# Patient Record
Sex: FEMALE | Race: BLACK OR AFRICAN AMERICAN | Employment: OTHER | URBAN - METROPOLITAN AREA
[De-identification: names, ages, dates, MRNs, and addresses within clinical notes are randomized per-mention and may not be internally consistent; named-entity substitution may affect disease eponyms.]

---

## 2022-01-31 ENCOUNTER — HOSPITAL ENCOUNTER (EMERGENCY)
Age: 72
Discharge: HOME | End: 2022-02-01
Attending: EMERGENCY MEDICINE | Admitting: EMERGENCY MEDICINE
Payer: MEDICARE

## 2022-01-31 VITALS
HEART RATE: 74 BPM | TEMPERATURE: 97.3 F | OXYGEN SATURATION: 95 % | SYSTOLIC BLOOD PRESSURE: 152 MMHG | RESPIRATION RATE: 18 BRPM | DIASTOLIC BLOOD PRESSURE: 84 MMHG

## 2022-01-31 DIAGNOSIS — R46.89 VERBALLY ABUSIVE BEHAVIOR: Primary | ICD-10-CM

## 2022-01-31 LAB
APPEARANCE UR: ABNORMAL
BACTERIA URNS QL MICRO: NEGATIVE /HPF
BILIRUB UR QL: NEGATIVE
CAOX CRY URNS QL MICRO: ABNORMAL
COLOR UR: ABNORMAL
EPITH CASTS URNS QL MICRO: ABNORMAL /LPF
GLUCOSE UR STRIP.AUTO-MCNC: NEGATIVE MG/DL
HGB UR QL STRIP: NEGATIVE
KETONES UR QL STRIP.AUTO: ABNORMAL MG/DL
LEUKOCYTE ESTERASE UR QL STRIP.AUTO: NEGATIVE
NITRITE UR QL STRIP.AUTO: NEGATIVE
PH UR STRIP: 5.5 [PH] (ref 5–8)
PROT UR STRIP-MCNC: NEGATIVE MG/DL
RBC #/AREA URNS HPF: ABNORMAL /HPF (ref 0–5)
SP GR UR REFRACTOMETRY: 1.02 (ref 1–1.03)
UR CULT HOLD, URHOLD: NORMAL
UROBILINOGEN UR QL STRIP.AUTO: 1 EU/DL (ref 0.2–1)
WBC URNS QL MICRO: ABNORMAL /HPF (ref 0–4)

## 2022-01-31 PROCEDURE — 81001 URINALYSIS AUTO W/SCOPE: CPT

## 2022-01-31 PROCEDURE — 90791 PSYCH DIAGNOSTIC EVALUATION: CPT

## 2022-01-31 PROCEDURE — 99284 EMERGENCY DEPT VISIT MOD MDM: CPT

## 2022-01-31 RX ORDER — OMEPRAZOLE 40 MG/1
40 CAPSULE, DELAYED RELEASE ORAL DAILY
COMMUNITY

## 2022-01-31 RX ORDER — ADHESIVE BANDAGE
30 BANDAGE TOPICAL DAILY
COMMUNITY

## 2022-01-31 RX ORDER — POTASSIUM CHLORIDE 750 MG/1
10 CAPSULE, EXTENDED RELEASE ORAL DAILY
COMMUNITY

## 2022-01-31 RX ORDER — BUMETANIDE 1 MG/1
1 TABLET ORAL DAILY
COMMUNITY

## 2022-01-31 RX ORDER — FLUTICASONE PROPIONATE 50 MCG
2 SPRAY, SUSPENSION (ML) NASAL DAILY
COMMUNITY

## 2022-01-31 RX ORDER — OLANZAPINE 10 MG/1
10 TABLET ORAL
COMMUNITY

## 2022-01-31 RX ORDER — GUAIFENESIN 100 MG/5ML
81 LIQUID (ML) ORAL DAILY
COMMUNITY

## 2022-01-31 RX ORDER — AMOXICILLIN 250 MG
2 CAPSULE ORAL DAILY
COMMUNITY

## 2022-01-31 RX ORDER — BUSPIRONE HYDROCHLORIDE 15 MG/1
15 TABLET ORAL 3 TIMES DAILY
COMMUNITY

## 2022-01-31 RX ORDER — FLUTICASONE PROPIONATE AND SALMETEROL 500; 50 UG/1; UG/1
1 POWDER RESPIRATORY (INHALATION) EVERY 12 HOURS
COMMUNITY

## 2022-01-31 RX ORDER — TRAZODONE HYDROCHLORIDE 100 MG/1
100 TABLET ORAL
COMMUNITY

## 2022-01-31 RX ORDER — TEMAZEPAM 30 MG/1
30 CAPSULE ORAL
COMMUNITY

## 2022-01-31 RX ORDER — ACETAMINOPHEN 325 MG/1
650 TABLET ORAL
COMMUNITY

## 2022-01-31 RX ORDER — FLUOXETINE HYDROCHLORIDE 60 MG/1
60 TABLET, FILM COATED ORAL; ORAL DAILY
COMMUNITY

## 2022-01-31 RX ORDER — POLYETHYLENE GLYCOL 3350 17 G/17G
17 POWDER, FOR SOLUTION ORAL DAILY
COMMUNITY

## 2022-01-31 RX ORDER — ONDANSETRON 4 MG/1
4 TABLET, FILM COATED ORAL
COMMUNITY

## 2022-02-01 PROCEDURE — 74011250637 HC RX REV CODE- 250/637: Performed by: EMERGENCY MEDICINE

## 2022-02-01 RX ORDER — ALPRAZOLAM 0.5 MG/1
0.25 TABLET ORAL
Status: COMPLETED | OUTPATIENT
Start: 2022-02-01 | End: 2022-02-01

## 2022-02-01 RX ADMIN — ALPRAZOLAM 0.25 MG: 0.5 TABLET ORAL at 01:21
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
## 2022-02-01 NOTE — DISCHARGE INSTRUCTIONS
You were evaluated by our mental health team. They do not feel you need to be admitted to our behavioral health unit tonight. Follow up with your psychiatrist and primary care doctor. If you feel you are in danger of harming  yourself or others, return here.
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
## 2022-02-01 NOTE — BSMART NOTE
This writer contacted [address] [place] [address] and spoke with attending nurse Monet Hernandez). She confirmed that patient will be able to discharge back to the nursing home. She also confirmed that patient has outpatient psychiatrist to follow up with and in-house  she meets with. Patient will be provided with 1:1 sitter until patient moves in with new roommate. No other concerns were voiced at this time.

## 2023-02-05 ENCOUNTER — APPOINTMENT (OUTPATIENT)
Dept: CT IMAGING | Age: 73
End: 2023-02-05
Attending: STUDENT IN AN ORGANIZED HEALTH CARE EDUCATION/TRAINING PROGRAM
Payer: MEDICARE

## 2023-02-05 ENCOUNTER — HOSPITAL ENCOUNTER (EMERGENCY)
Age: 73
Discharge: NURSING, REHAB OR LONG-TERM CARE | End: 2023-02-05
Attending: STUDENT IN AN ORGANIZED HEALTH CARE EDUCATION/TRAINING PROGRAM
Payer: MEDICARE

## 2023-02-05 ENCOUNTER — APPOINTMENT (OUTPATIENT)
Dept: GENERAL RADIOLOGY | Age: 73
End: 2023-02-05
Attending: STUDENT IN AN ORGANIZED HEALTH CARE EDUCATION/TRAINING PROGRAM
Payer: MEDICARE

## 2023-02-05 VITALS
HEART RATE: 71 BPM | DIASTOLIC BLOOD PRESSURE: 72 MMHG | RESPIRATION RATE: 17 BRPM | TEMPERATURE: 97.6 F | WEIGHT: 258.16 LBS | OXYGEN SATURATION: 95 % | SYSTOLIC BLOOD PRESSURE: 112 MMHG

## 2023-02-05 DIAGNOSIS — S49.92XA INJURY OF LEFT SHOULDER, INITIAL ENCOUNTER: ICD-10-CM
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
## 2023-02-05 DIAGNOSIS — S09.90XA INJURY OF HEAD, INITIAL ENCOUNTER: ICD-10-CM

## 2023-02-05 DIAGNOSIS — S16.1XXA STRAIN OF NECK MUSCLE, INITIAL ENCOUNTER: ICD-10-CM

## 2023-02-05 PROCEDURE — 73030 X-RAY EXAM OF SHOULDER: CPT

## 2023-02-05 PROCEDURE — 72125 CT NECK SPINE W/O DYE: CPT

## 2023-02-05 PROCEDURE — 70450 CT HEAD/BRAIN W/O DYE: CPT

## 2023-02-05 PROCEDURE — 99284 EMERGENCY DEPT VISIT MOD MDM: CPT
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
## 2023-02-05 NOTE — ED NOTES
Verbal shift change report given to Adair Poe RN  (oncoming nurse) by Ruben Coker (offgoing nurse). Report included the following information SBAR, ED Summary, and Recent Results - Awaiting CT results. Eva King

## 2023-02-05 NOTE — ROUTINE PROCESS
I have reviewed discharge instructions with the patient. The patient verbalized understanding. Attempted to call report to IVETTE DORAN JR. [facility] supervisor; no response.

## 2023-02-05 NOTE — DISCHARGE INSTRUCTIONS
Seek immediate care for increased sleepiness, irritability, focal deficits (not moving an arm or leg, face looks different, numbness) and vomiting more than once.      RETURN IF WORSENING PAIN, CHANGE IN COLOR, CHANGE IN TEMPERATURE, OR LOSS OF SENSATION IN THE AFFECTED EXTREMITY      RETURN IF WORSENING PAIN, TROUBLE HOLDING STOOL/URINE, RADIATION OF PAIN, OR WEAKNESS/NUMBNESS

## 2023-02-05 NOTE — ED PROVIDER NOTES
The patient is 62-year-old female history of paranoid schizophrenia, obesity, hypertension, hyperlipidemia, GERD, diabetes, COPD, CHF presenting today secondary to abdominal fall. Patient is unsteady at baseline on her feet. She states that she was trying to get to the bathroom overnight this evening and was going to fast, lost her balance and fell. She struck the back of her head on the metal bed frame. No loss of consciousness. She was able to get up afterwards and go to the bathroom. She reports that she does have some neck pain as well as some pain/numbness to the posterior aspect of her left shoulder but does not radiate down the arm. No chest or abdominal injury. No lower extremity injury. She is not on blood thinners. Vital signs stable per EMS while here. No medications taken prior to arrival.  Patient reports pain is mild at this time.        Past Medical History:   Diagnosis Date    Anemia     Anxiety     CAD (coronary artery disease)     CHF (congestive heart failure) (HCC)     Chronic obstructive pulmonary disease (HCC)     COVID-19     Diabetes (HCC)     Difficulty walking     GERD (gastroesophageal reflux disease)     HTN (hypertension)     MDD (major depressive disorder)     Mixed hyperlipidemia     Morbid obesity (HCC)     Pain     Paranoid schizophrenia ([facility]     Polyosteoarthritis          Social History     Socioeconomic History    Marital status:      Spouse name: Not on file    Number of children: Not on file    Years of education: Not on file    Highest education level: Not on file   Occupational History    Not on file   Tobacco Use    Smoking status: Unknown    Smokeless tobacco: Not on file   Substance and Sexual Activity    Alcohol use: Not Currently    Drug use: Never    Sexual activity: Not on file   Other Topics Concern    Not on file   Social History Narrative    Not on file     Social Determinants of Health     Financial Resource Strain: Not on file   Food Insecurity: Not on file   Transportation Needs: Not on file   Physical Activity: Not on file   Stress: Not on file   Social Connections: Not on file   Intimate Partner Violence: Not on file   Housing Stability: Not on file         ALLERGIES: Ace inhibitors, Lithium carbonate, Seroquel [quetiapine], and Shrimp    Review of Systems   Constitutional:  Negative for chills and fever. HENT:  Negative for congestion and rhinorrhea. Eyes:  Negative for redness and visual disturbance. Respiratory:  Negative for cough and shortness of breath. Cardiovascular:  Negative for chest pain and leg swelling. Gastrointestinal:  Negative for abdominal pain, diarrhea, nausea and vomiting. Genitourinary:  Negative for dysuria, flank pain, frequency, hematuria and urgency. Musculoskeletal:  Positive for arthralgias and neck pain. Negative for back pain and myalgias. Skin:  Negative for rash and wound. Allergic/Immunologic: Negative for immunocompromised state. Neurological:  Positive for numbness and headaches. Negative for dizziness. Vitals:    02/05/23 0220 02/05/23 0230 02/05/23 0300 02/05/23 0315   BP: (!) 153/69 136/68  (!) 141/68   Pulse: 71      Resp: 18      Temp: 97.6 °F (36.4 °C)      SpO2: 92% 91% 93% 94%   Weight: 117.1 kg (258 lb 2.5 oz)               Physical Exam  Vitals and nursing note reviewed. Constitutional:       General: She is not in acute distress. Appearance: She is well-developed. She is not diaphoretic. HENT:      Head: Normocephalic. Comments: Large hematoma to the left occipital region  No hemotympanum  No epistaxis  No soares sign or raccoon eyes     Mouth/Throat:      Pharynx: No oropharyngeal exudate. Eyes:      General:         Right eye: No discharge. Left eye: No discharge. Pupils: Pupils are equal, round, and reactive to light. Neck:      Comments: Diffuse midline tenderness  Cardiovascular:      Rate and Rhythm: Normal rate and regular rhythm.       Heart sounds: Normal heart sounds. No murmur heard. No friction rub. No gallop. Pulmonary:      Effort: Pulmonary effort is normal. No respiratory distress. Breath sounds: Normal breath sounds. No stridor. No wheezing or rales. Abdominal:      General: Bowel sounds are normal. There is no distension. Palpations: Abdomen is soft. Tenderness: There is no abdominal tenderness. There is no guarding or rebound. Musculoskeletal:         General: No deformity. Normal range of motion. Cervical back: Normal range of motion and neck supple. Comments: No T/L spine tenderness  No chest wall tenderness, no crepitus, no flail segment  Upper and lower extremities fully ranged, visualized, and palpated and only finding is pain with ROM of L shoulder and mild posterior tenderness--radial pulse intact  No snuff box tenderness or pain with axial loading of the thumb. The hips are non-tender with bilateral compression  Pelvis is stable  Ambulating without difficulty       Skin:     General: Skin is warm and dry. Capillary Refill: Capillary refill takes less than 2 seconds. Findings: No rash. Neurological:      Mental Status: She is alert and oriented to person, place, and time. Psychiatric:         Behavior: Behavior normal.        Medical Decision Making  72 y.o. female presents today after mechanical GLF. Patient arrived in no acute distress with stable VS. C/o some pain/numbness to the shoulder. Hx/exam not c/w central cord syndrome (isolated to L shoulder only) especially in light of neg CT. CT head neg. Large cephalohematoma but  no lacerations. Pt with minimal pain. Appropriate for dc home.      Problems Addressed:  Fall from ground level: acute illness or injury  Injury of head, initial encounter: acute illness or injury  Injury of left shoulder, initial encounter: acute illness or injury  Strain of neck muscle, initial encounter: acute illness or injury    Amount and/or Complexity of Data Reviewed  Independent Historian: EMS  Radiology: ordered.            Procedures Unna Boot Text: An Unna boot was placed to help immobilize the limb and facilitate more rapid healing.

## 2023-05-17 ENCOUNTER — HOSPITAL ENCOUNTER (EMERGENCY)
Facility: HOSPITAL | Age: 73
Discharge: HOME | End: 2023-05-17
Attending: EMERGENCY MEDICINE
Payer: MEDICARE

## 2023-05-17 VITALS
DIASTOLIC BLOOD PRESSURE: 69 MMHG | OXYGEN SATURATION: 99 % | RESPIRATION RATE: 18 BRPM | TEMPERATURE: 97.7 F | SYSTOLIC BLOOD PRESSURE: 147 MMHG | HEART RATE: 68 BPM

## 2023-05-17 DIAGNOSIS — R45.1 AGITATION: Primary | ICD-10-CM

## 2023-05-17 PROCEDURE — 99283 EMERGENCY DEPT VISIT LOW MDM: CPT

## 2023-05-17 ASSESSMENT — PAIN DESCRIPTION - LOCATION: LOCATION: LEG

## 2023-05-17 ASSESSMENT — PAIN SCALES - GENERAL: PAINLEVEL_OUTOF10: 3

## 2023-05-17 ASSESSMENT — PAIN DESCRIPTION - PAIN TYPE: TYPE: CHRONIC PAIN

## 2023-05-17 ASSESSMENT — ENCOUNTER SYMPTOMS
COLOR CHANGE: 0
SHORTNESS OF BREATH: 0
TROUBLE SWALLOWING: 0
ABDOMINAL PAIN: 0

## 2023-05-17 ASSESSMENT — PAIN DESCRIPTION - ORIENTATION: ORIENTATION: LEFT

## 2023-05-17 ASSESSMENT — PAIN DESCRIPTION - DESCRIPTORS: DESCRIPTORS: ACHING
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
## 2023-05-17 NOTE — ED NOTES
Bedside and Verbal shift change report given to [address] [facility] Denny (oncoming nurse) by Susu Lanier (offgoing nurse). Report included the following information ED Encounter Summary, ED SBAR, Intake/Output, and Recent Results.        Damion Beth RN  05/17/23 4433

## 2023-05-17 NOTE — ED NOTES
Report called to [facility].  Amina Canales with care management setting up transport with [facility] at this time     Leta Wild, RN  05/17/23 2079

## 2023-05-17 NOTE — PROGRESS NOTES
ED Care Management:    Transition of Care Plan:       Disposition: return to [facility] ([phone]) wheelchair Tha Martinez is       Received consult to assist with transportation back to facility - [facility]. RN has called facility and they will receive her back. [facility] and spoke with [facility].  ETA will be 8:30 PM.    [address] [facility]
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
## 2023-05-18 ENCOUNTER — HOSPITAL ENCOUNTER (EMERGENCY)
Facility: HOSPITAL | Age: 73
Discharge: HOME | End: 2023-05-18
Payer: MEDICARE

## 2023-05-18 VITALS
SYSTOLIC BLOOD PRESSURE: 132 MMHG | DIASTOLIC BLOOD PRESSURE: 71 MMHG | RESPIRATION RATE: 18 BRPM | HEART RATE: 86 BPM | TEMPERATURE: 97.8 F | OXYGEN SATURATION: 97 %

## 2023-05-18 DIAGNOSIS — F41.1 ANXIETY STATE: Primary | ICD-10-CM

## 2023-05-18 LAB
APAP SERPL-MCNC: <2 UG/ML (ref 10–30)
BASOPHILS # BLD: 0.1 K/UL (ref 0–0.1)
BASOPHILS NFR BLD: 1 % (ref 0–1)
COMMENT:: NORMAL
DIFFERENTIAL METHOD BLD: NORMAL
EOSINOPHIL # BLD: 0.3 K/UL (ref 0–0.4)
EOSINOPHIL NFR BLD: 4 % (ref 0–7)
ERYTHROCYTE [DISTWIDTH] IN BLOOD BY AUTOMATED COUNT: 14 % (ref 11.5–14.5)
ETHANOL SERPL-MCNC: <10 MG/DL (ref 0–0.08)
FLUAV RNA SPEC QL NAA+PROBE: NOT DETECTED
FLUBV RNA SPEC QL NAA+PROBE: NOT DETECTED
HCT VFR BLD AUTO: 38.1 % (ref 35–47)
HGB BLD-MCNC: 11.8 G/DL (ref 11.5–16)
IMM GRANULOCYTES # BLD AUTO: 0 K/UL (ref 0–0.04)
IMM GRANULOCYTES NFR BLD AUTO: 0 % (ref 0–0.5)
LYMPHOCYTES # BLD: 2.8 K/UL (ref 0.8–3.5)
LYMPHOCYTES NFR BLD: 36 % (ref 12–49)
MCH RBC QN AUTO: 27.9 PG (ref 26–34)
MCHC RBC AUTO-ENTMCNC: 31 G/DL (ref 30–36.5)
MCV RBC AUTO: 90.1 FL (ref 80–99)
MONOCYTES # BLD: 0.4 K/UL (ref 0–1)
MONOCYTES NFR BLD: 6 % (ref 5–13)
NEUTS SEG # BLD: 4.2 K/UL (ref 1.8–8)
NEUTS SEG NFR BLD: 53 % (ref 32–75)
NRBC # BLD: 0 K/UL (ref 0–0.01)
NRBC BLD-RTO: 0 PER 100 WBC
PLATELET # BLD AUTO: 242 K/UL (ref 150–400)
PMV BLD AUTO: 10.7 FL (ref 8.9–12.9)
RBC # BLD AUTO: 4.23 M/UL (ref 3.8–5.2)
SALICYLATES SERPL-MCNC: <1.7 MG/DL (ref 2.8–20)
SARS-COV-2 RNA RESP QL NAA+PROBE: NOT DETECTED
SPECIMEN HOLD: NORMAL
WBC # BLD AUTO: 7.8 K/UL (ref 3.6–11)

## 2023-05-18 PROCEDURE — 87636 SARSCOV2 & INF A&B AMP PRB: CPT

## 2023-05-18 PROCEDURE — 80143 DRUG ASSAY ACETAMINOPHEN: CPT

## 2023-05-18 PROCEDURE — 85025 COMPLETE CBC W/AUTO DIFF WBC: CPT

## 2023-05-18 PROCEDURE — 36415 COLL VENOUS BLD VENIPUNCTURE: CPT

## 2023-05-18 PROCEDURE — 82077 ASSAY SPEC XCP UR&BREATH IA: CPT

## 2023-05-18 PROCEDURE — 80179 DRUG ASSAY SALICYLATE: CPT

## 2023-05-18 RX ORDER — FLUOXETINE HYDROCHLORIDE 60 MG/1
60 TABLET, FILM COATED ORAL; ORAL DAILY
COMMUNITY

## 2023-05-18 RX ORDER — HYDROXYZINE 50 MG/1
50 TABLET, FILM COATED ORAL 3 TIMES DAILY PRN
COMMUNITY
End: 2023-05-18

## 2023-05-18 RX ORDER — HYDROXYZINE HYDROCHLORIDE 25 MG/1
25 TABLET, FILM COATED ORAL EVERY 8 HOURS PRN
Qty: 30 TABLET | Refills: 0 | Status: SHIPPED | OUTPATIENT
Start: 2023-05-18 | End: 2023-05-28

## 2023-05-18 RX ORDER — SIMVASTATIN 20 MG
20 TABLET ORAL NIGHTLY
COMMUNITY
Start: 2014-07-21

## 2023-05-18 RX ORDER — BUMETANIDE 1 MG/1
1 TABLET ORAL DAILY
COMMUNITY

## 2023-05-18 RX ORDER — FLUTICASONE PROPIONATE AND SALMETEROL 500; 50 UG/1; UG/1
1 POWDER RESPIRATORY (INHALATION) EVERY 12 HOURS
COMMUNITY

## 2023-05-18 RX ORDER — BUSPIRONE HYDROCHLORIDE 15 MG/1
15 TABLET ORAL 3 TIMES DAILY
COMMUNITY

## 2023-05-18 RX ORDER — TEMAZEPAM 30 MG/1
30 CAPSULE ORAL
COMMUNITY
Start: 2015-02-20

## 2023-05-18 RX ORDER — TRAZODONE HYDROCHLORIDE 100 MG/1
100 TABLET ORAL NIGHTLY
COMMUNITY

## 2023-05-18 RX ORDER — DIVALPROEX SODIUM 500 MG/1
500 TABLET, DELAYED RELEASE ORAL 2 TIMES DAILY
COMMUNITY

## 2023-05-18 NOTE — ED NOTES
Pt discharged with paperwork. Pt had no questions about discharge plan and was alert & oriented in no distress.        Tracy Hoover RN  05/18/23 7222

## 2023-05-18 NOTE — DISCHARGE INSTRUCTIONS
[address] [facility]   [phone]   [place]   [phone]   [facility]: [address] [phone]       [address] [facility]   [phone]   [address]   [facility]     [phone]   [address] Crisis   [address] [phone]       Local Primary Care Physicians  [facility] [phone]  MD Berta Ferrari MD Samuel Oven, MD [facility] [phone]  [facility], [facility]  Alicia Beverly, MD Tabatha Olmedo MD [address]  [phone]  MD Shen Arevalo MD [address] [facility] [phone]  MD Leroy Rojo MD Ralston Barr, MD Dewain Nine, MD   [facility] [phone]  VYXX WNJYYZ MD Coby ROBERTSON MD Alan Edin, NP [address] [phone]  MD Ignacia Gonzales, MD Marco Antonio Hu MD Marletta Sinks, MD Eben Bouquet, MD Gery Gather, MD   [address] [facility] MD Perry [address] [phone]  MD Darby Olson, NP  MD Isabel Puente MD Jerold Miyamoto, MD Librada Sickle, MD Lynda Kotyk, MD   [address] [facility] [phone]  MD Saba Delarosa, P  Thien Youssef, NP  MD Maryam Mandel MD Jon How, MD Jory Spine, MD ROBISON [facility] [phone]  MD Trista Alfonso MD Lucie Heman, MD Giovanni Hole, MD Jaye Jenny, MD   [address] [phone]  MD Darren Kumar MD [facility] [phone]  MD Sudha Christensen MD Jannett Morgan

## 2023-05-18 NOTE — ED NOTES
H onsite to  patient. Pt discharged with paperwork. Pt had no questions about discharge plan and was alert & oriented in no distress.        Snehal Quijano RN  05/17/23 2026

## 2023-05-18 NOTE — ED PROVIDER NOTES
Chief Complaint   Patient presents with    Mental Health Problem       INITIAL ASSESSMENT & PLAN   3:57 AM EDT  Differential diagnosis includes but is not limited to ***  ***    I have obtained additional independent history from:   [facility]:  --[employer] summarizing: ***    MEDICAL DECISION MAKING     In summary, Emily Begum is a 72 y.o. female presented to the ED with ***    Results independently interpreted below, notably:  ***      HISTORY OF PRESENT ILLNESS   Additional independent historian:    HPI  ***    REVIEW OF SYSTEMS  Review of Systems  {ROS:1234:p:\"Limited 2/2 the above\", \"I performed a 10-point review of systems which have been otherwise included in the HPI as documented, otherwise all other systems have been reviewed and are negative. \"}    MEDICAL HISTORY  Past Medical History:   Diagnosis Date    Anemia     Anxiety     CAD (coronary artery disease)     CHF (congestive heart failure) (HCC)     Chronic obstructive pulmonary disease ([facility] [address]     COVID-19     Diabetes (HCC)     Difficulty walking     GERD (gastroesophageal reflux disease)     HTN (hypertension)     MDD (major depressive disorder)     Mixed hyperlipidemia     Morbid obesity (HCC)     Pain     Paranoid schizophrenia ([facility] [address]     Polyosteoarthritis      No past surgical history on file. No family history on file. Social History     Tobacco Use    Smoking status: Unknown   Substance Use Topics    Alcohol use: Not Currently    Drug use: Never     ALLERGIES: Prozac [fluoxetine] and Seroquel [quetiapine]  Medications  No current facility-administered medications on file prior to encounter. Current Outpatient Medications on File Prior to Encounter   Medication Sig Dispense Refill    temazepam (RESTORIL) 30 MG capsule Take 1 capsule by mouth.       simvastatin (ZOCOR) 20 MG tablet Take 1 tablet by mouth nightly      traZODone (DESYREL) 100 MG tablet Take 1
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
## 2023-05-26 RX ORDER — OLANZAPINE 10 MG/1
10 TABLET ORAL NIGHTLY
COMMUNITY

## 2023-05-26 RX ORDER — AMOXICILLIN 250 MG
2 CAPSULE ORAL DAILY
COMMUNITY

## 2023-05-26 RX ORDER — OMEPRAZOLE 40 MG/1
40 CAPSULE, DELAYED RELEASE ORAL DAILY
COMMUNITY

## 2023-05-26 RX ORDER — POLYETHYLENE GLYCOL 3350 17 G/17G
17 POWDER, FOR SOLUTION ORAL DAILY
COMMUNITY

## 2023-05-26 RX ORDER — POTASSIUM CHLORIDE 750 MG/1
10 CAPSULE, EXTENDED RELEASE ORAL DAILY
COMMUNITY

## 2023-05-26 RX ORDER — ASPIRIN 81 MG/1
81 TABLET, CHEWABLE ORAL DAILY
COMMUNITY

## 2023-05-26 RX ORDER — ONDANSETRON 4 MG/1
4 TABLET, FILM COATED ORAL EVERY 8 HOURS PRN
COMMUNITY

## 2023-05-26 RX ORDER — FLUTICASONE PROPIONATE 50 MCG
2 SPRAY, SUSPENSION (ML) NASAL DAILY
COMMUNITY

## 2023-05-26 RX ORDER — ACETAMINOPHEN 325 MG/1
650 TABLET ORAL EVERY 6 HOURS PRN
COMMUNITY